# Patient Record
Sex: FEMALE | Race: WHITE | NOT HISPANIC OR LATINO | Employment: UNEMPLOYED | URBAN - METROPOLITAN AREA
[De-identification: names, ages, dates, MRNs, and addresses within clinical notes are randomized per-mention and may not be internally consistent; named-entity substitution may affect disease eponyms.]

---

## 2017-03-02 ENCOUNTER — OFFICE VISIT (OUTPATIENT)
Dept: OBSTETRICS AND GYNECOLOGY | Facility: CLINIC | Age: 32
End: 2017-03-02

## 2017-03-02 VITALS
DIASTOLIC BLOOD PRESSURE: 76 MMHG | SYSTOLIC BLOOD PRESSURE: 132 MMHG | HEIGHT: 66 IN | WEIGHT: 122.2 LBS | BODY MASS INDEX: 19.64 KG/M2

## 2017-03-02 DIAGNOSIS — R87.613 HGSIL (HIGH GRADE SQUAMOUS INTRAEPITHELIAL LESION) ON PAP SMEAR OF CERVIX: Primary | ICD-10-CM

## 2017-03-02 DIAGNOSIS — Z01.419 ENCOUNTER FOR GYNECOLOGICAL EXAMINATION WITHOUT ABNORMAL FINDING: ICD-10-CM

## 2017-03-02 DIAGNOSIS — N60.12 FIBROCYSTIC BREAST CHANGES, BILATERAL: ICD-10-CM

## 2017-03-02 DIAGNOSIS — N60.11 FIBROCYSTIC BREAST CHANGES, BILATERAL: ICD-10-CM

## 2017-03-02 PROCEDURE — 99395 PREV VISIT EST AGE 18-39: CPT | Performed by: OBSTETRICS & GYNECOLOGY

## 2017-03-02 NOTE — PROGRESS NOTES
"   Chief Complaint   Patient presents with   • Gynecologic Exam   • Follow-up     repeat pap       Rachel Bryson is a 31 y.o. year old  presenting to be seen for an annual wellness visit.  This patient had a Pap test 10/11/2016 read as high-grade SAY (moderate dysplasia).  She underwent colposcopy on 10/24/2016 with colposcopically directed biopsies read as low-grade SAY (mild dysplasia).  She returns today for a follow-up Pap test.  She denies abnormal discharge.  She has cyclic menses without intermenstrual bleeding.  She has fibrocystic changes of her breasts.       SCREENING TESTS    Year 2012 2016 2017 2018   Age                         PAP     HGSIL                    HPV high risk     +                    Mammogram                         MONALISA score                         Breast MRI                         Lipids                         Vitamin D                         Colonoscopy                         DEXA  Frax (hip/any)                         Ovarian Screen                           Enter the month test was performed.  If month not known, enter \"X'  · Black numbers = normal results  · Red numbers = abnormal results  · Black X = patient reported normal  · Red X - patient reported abnormal      Referred by:    Profession:    Other info:        Her LMP was Patient's last menstrual period was 2017 (exact date)..  Her cycles are regular, predictable and consistent every 28 - 32 days.  Usually her flow is normal with no more than 1 days of heavy flow each menses.   Additionally she describes No Symptoms associated with her menses.    History   Sexual Activity   • Sexual activity: Yes   • Partners: Male   • Birth control/ protection: Condom    She would not like to be screened for STD's at today's exam.     Current contraceptive methods being used: condoms.  In the coming year, she is not " "considering changing her current contraceptive method.    She exercises regularly: yes.  She wears her seat belt: yes.  She has concerns about domestic violence: no.    GYN screening history:  · Last pap: was done on approximately 10/11/2016 and the result was: HGSIL- moderate and severe dysplasia..    No Additional Complaints Reported    The following portions of the patient's history were reviewed and updated as appropriate:vital signs and   She  does not have any pertinent problems on file.  She  has a past surgical history that includes Tonsillectomy.  Her family history is not on file.  She  reports that she has never smoked. She does not have any smokeless tobacco history on file. She reports that she drinks alcohol. She reports that she does not use illicit drugs.  No current outpatient prescriptions on file.     No current facility-administered medications for this visit.      She has No Known Allergies..    Review of Systems  A comprehensive review of systems was negative.  Constitutional: negative for fever, chills, activity change, appetite change, fatigue and unexpected weight change.  Respiratory: negative  Cardiovascular: negative  Gastrointestinal: negative  Genitourinary:negative  Musculoskeletal:negative  Behavioral/Psych: negative     Objective     Visit Vitals   • /76   • Ht 66\" (167.6 cm)   • Wt 122 lb 3.2 oz (55.4 kg)   • LMP 02/19/2017 (Exact Date)   • BMI 19.72 kg/m2       Physical Exam    General:  alert; cooperative; well developed; well nourished   Skin:  No suspicious lesions seen   Thyroid: normal to inspection and palpation   Lungs:  clear to auscultation bilaterally   Heart:  regular rate and rhythm, S1, S2 normal, no murmur, click, rub or gallop   Breasts:  Examined in supine position  Symmetric without masses or skin dimpling  Nipples normal without inversion, lesions or discharge  There are no palpable axillary nodes  Fibrocystic changes are present both breasts without a " discrete mass   Abdomen: soft, non-tender; no masses  no umbilical or inginual hernias are present  no hepato-splenomegaly   Pelvis: Clinical staff was present for exam  External genitalia:  normal appearance of the external genitalia including Bartholin's and [place]'s glands.  Vaginal:  normal pink mucosa without prolapse or lesions.  Cervix:  normal appearance.  Uterus:  normal size, shape and consistency. anteverted;  Adnexa:  normal bimanual exam of the adnexa.  Rectal:  anus visually normal appearing. recto-vaginal exam unremarkable and confirms findings;     Lab Review   Pap results- HGSIL    Imaging  No data reviewed       ASSESSMENT  Problems Addressed this Visit        Genitourinary    HGSIL (high grade squamous intraepithelial lesion) on Pap smear of cervix - Primary    Relevant Orders    Liquid-based Pap Smear, Screening      Other Visit Diagnoses     Encounter for gynecological examination without abnormal finding              PLAN  · Medications prescribed this encounter: None  · Follow up: 6 month(s)   · Pap test done      *Please note that portions of this documentation may have been completed with a voice recognition program.  Efforts were made to edit this dictation, but occasional words may have been mistranscribed.       This note was electronically signed.    MIKE Medeiros MD  March 2, 2017  1:25 PM

## 2017-09-07 ENCOUNTER — OFFICE VISIT (OUTPATIENT)
Dept: OBSTETRICS AND GYNECOLOGY | Facility: CLINIC | Age: 32
End: 2017-09-07

## 2017-09-07 VITALS
BODY MASS INDEX: 18.68 KG/M2 | WEIGHT: 116.2 LBS | DIASTOLIC BLOOD PRESSURE: 70 MMHG | SYSTOLIC BLOOD PRESSURE: 102 MMHG | HEIGHT: 66 IN

## 2017-09-07 DIAGNOSIS — Z87.410 HISTORY OF CERVICAL DYSPLASIA: Primary | ICD-10-CM

## 2017-09-07 DIAGNOSIS — Z30.011 OCP (ORAL CONTRACEPTIVE PILLS) INITIATION: ICD-10-CM

## 2017-09-07 PROCEDURE — 99213 OFFICE O/P EST LOW 20 MIN: CPT | Performed by: OBSTETRICS & GYNECOLOGY

## 2017-09-07 RX ORDER — NORETHINDRONE ACETATE AND ETHINYL ESTRADIOL 1.5-30(21)
1 KIT ORAL DAILY
Qty: 28 TABLET | Refills: 6 | Status: SHIPPED | OUTPATIENT
Start: 2017-09-07 | End: 2017-09-19 | Stop reason: SDUPTHER

## 2017-09-07 NOTE — PROGRESS NOTES
Chief Complaint   Patient presents with   • Follow-up     repeat pap   • Contraception     desires oral contraceptives       Rachel Bryson is a 31 y.o. year old  presenting to be seen for a PAP in follow-up of a prior abnormal PAP and/or HPV screen. This patient has a history of a Pap test 10/24/2016 read as high-grade SAY.  She had colposcopy with directed biopsies on 10/24/2016 revealing FAUSTINO 1.  Her follow-up Pap test on 3/2/2017 was negative.  She returns for a follow-up Pap test.  She desires initiation of oral contraceptives and prefers a 30 µg dose.  He has had no side effects on oral contraceptives in the past.    Her last PAP: was done on approximately 3/2/2017 and the result was: normal PAP.  Her last HPV test: was done on approximately 3/2/2017 and the result was:  negative  Uses tobacco currently: no  Sexually active: yes  Current contraception: none    No Additional Complaints Reported    The following portions of the patient's history were reviewed and updated as appropriate:vital signs and   She  does not have any pertinent problems on file.  She  has a past surgical history that includes Tonsillectomy.  Her family history is not on file.  She  reports that she has never smoked. She has never used smokeless tobacco. She reports that she drinks alcohol. She reports that she does not use illicit drugs.  Current Outpatient Prescriptions   Medication Sig Dispense Refill   • norethindrone-ethinyl estradiol-iron (MICROGESTIN FE 1.5/30) 1.5-30 MG-MCG tablet Take 1 tablet by mouth Daily for 185 days. 28 tablet 6     No current facility-administered medications for this visit.      She has No Known Allergies..    Review of Systems  A comprehensive review of systems was negative.  Constitutional: negative for fever, chills, activity change, appetite change, fatigue and unexpected weight change.  Respiratory: negative  Cardiovascular: negative  Gastrointestinal:  "negative  Genitourinary:negative  Musculoskeletal:negative  Behavioral/Psych: negative        /70  Ht 66\" (167.6 cm)  Wt 116 lb 3.2 oz (52.7 kg)  LMP 08/26/2017 (Exact Date)  BMI 18.76 kg/m2    Physical Exam    General:  alert; cooperative; well developed; well nourished   Thyroid: normal to inspection and palpation   Abdomen: soft, non-tender; no masses  no umbilical or inginual hernias are present  no hepato-splenomegaly   Pelvis: Clinical staff was present for exam  External genitalia:  normal appearance of the external genitalia including Bartholin's and [place]'s glands.  Vaginal:  normal pink mucosa without prolapse or lesions.  Cervix:  normal appearance.  Uterus:  normal size, shape and consistency. anteverted;  Adnexa:  normal bimanual exam of the adnexa.  Rectal:  anus visually normal appearing. recto-vaginal exam unremarkable and confirms findings;     Lab Review   Pap test results- negative    Imaging   No data reviewed       ASSESSMENT  Problems Addressed this Visit     None      Visit Diagnoses     History of cervical dysplasia    -  Primary    Relevant Orders    Liquid-based Pap Smear, Screening    OCP (oral contraceptive pills) initiation        Relevant Medications    norethindrone-ethinyl estradiol-iron (MICROGESTIN FE 1.5/30) 1.5-30 MG-MCG tablet              PLAN     Follow up: 6 month(s) for Pap and Annual  Pap test done  Microgestin, 1.5/30 OCPs prescribed   *Please note that portions of this documentation may have been completed with a voice recognition program.  Efforts were made to edit this dictation, but occasional words may have been mistranscribed.  This note was electronically signed.    MIKE Medeiros MD  September 7, 2017  3:08 PM     "

## 2017-09-19 ENCOUNTER — OFFICE VISIT (OUTPATIENT)
Dept: OBSTETRICS AND GYNECOLOGY | Facility: CLINIC | Age: 32
End: 2017-09-19

## 2017-09-19 VITALS
DIASTOLIC BLOOD PRESSURE: 66 MMHG | HEIGHT: 66 IN | BODY MASS INDEX: 18.64 KG/M2 | SYSTOLIC BLOOD PRESSURE: 120 MMHG | WEIGHT: 116 LBS

## 2017-09-19 DIAGNOSIS — R87.613 HGSIL (HIGH GRADE SQUAMOUS INTRAEPITHELIAL LESION) ON PAP SMEAR OF CERVIX: Primary | ICD-10-CM

## 2017-09-19 DIAGNOSIS — Z30.011 OCP (ORAL CONTRACEPTIVE PILLS) INITIATION: ICD-10-CM

## 2017-09-19 PROCEDURE — 57455 BIOPSY OF CERVIX W/SCOPE: CPT | Performed by: OBSTETRICS & GYNECOLOGY

## 2017-09-19 RX ORDER — NORETHINDRONE ACETATE AND ETHINYL ESTRADIOL 1.5-30(21)
1 KIT ORAL DAILY
Qty: 28 TABLET | Refills: 5 | Status: SHIPPED | OUTPATIENT
Start: 2017-09-19 | End: 2018-03-23

## 2017-09-19 NOTE — PROGRESS NOTES
"Date of precedure: 9/19/2017  This patient was seen in our office on 10/11/16 for a Pap test that was read as high-grade SAY, HPV negative.  She underwent colposcopy on 10/24/16 with colposcopically directed biopsies read as mild dysplasia (FAUSTINO-1).  Her follow-up Pap test on 3/2/17 was negative, HPV-negative.  Her last Pap test on 9/17/17 was once again read as high-grade SAY, HPV negative.  She presents today for follow-up colposcopy with directed biopsies.  The procedure has once again been explained to the patient including the risks of pain, bleeding, and infection.  She voiced understanding of these risks.  /66  Ht 66\" (167.6 cm)  Wt 116 lb (52.6 kg)  LMP 08/26/2017 (Exact Date)  BMI 18.72 kg/m2    No OB History data found    Risks and benefits discussed? yes  All questions answered? yes  Consents given by the patient  Written consent obtained? yes    Local anesthesia used:  no    Pre-op indication: HGSIL- moderate and severe dysplasia  Procedure documentation:    The cervix was initially viewed colposcopically through a green filter.  The cervix was next bathed in dilute, acetic acid.   The findings were as follows:      The transformation zone was able to be seen adequately.    Findings  Mosaicism noted at 1 o'clock and 11 o'clock Physical Exam: Lungs- clear to auscultation,  C-V- RRR, with no murmur, rub or gallop  Abd.- Soft, non-tender, with no organomegaly           Ectocervical biopsies taken from 1 o'clock and 11 o'clock.  Monsels solution was applied to the biopsy sites..  An ECC was not performed.      Colposcopic Impression: 1. Adequate colposcopy  2. Colposcopic findings are inconsistent with PAP       Plan: Will base further treatment on pathology results  Have discussed Gardasil 9 with the patient and she will inquire about its cost since she is 31 years old.    *Please note that portions of this documentation may have been completed with a voice recognition program.  Efforts were made " to edit this dictation, but occasional words may have been mistranscribed.     This note was electronically signed.    MIKE Medeiros MD  September 19, 2017  9:02 AM

## 2017-10-30 ENCOUNTER — OFFICE VISIT (OUTPATIENT)
Dept: OBSTETRICS AND GYNECOLOGY | Facility: CLINIC | Age: 32
End: 2017-10-30

## 2017-10-30 VITALS
WEIGHT: 120.6 LBS | SYSTOLIC BLOOD PRESSURE: 110 MMHG | HEIGHT: 66 IN | DIASTOLIC BLOOD PRESSURE: 72 MMHG | BODY MASS INDEX: 19.38 KG/M2

## 2017-10-30 DIAGNOSIS — R87.613 HGSIL (HIGH GRADE SQUAMOUS INTRAEPITHELIAL LESION) ON PAP SMEAR OF CERVIX: Primary | ICD-10-CM

## 2017-10-30 PROCEDURE — 57461 CONZ OF CERVIX W/SCOPE LEEP: CPT | Performed by: OBSTETRICS & GYNECOLOGY

## 2017-10-30 RX ORDER — NAPROXEN SODIUM 550 MG/1
TABLET ORAL
Qty: 20 TABLET | Refills: 0 | Status: SHIPPED | OUTPATIENT
Start: 2017-10-30

## 2017-10-30 RX ORDER — DOXYCYCLINE HYCLATE 100 MG/1
100 CAPSULE ORAL 2 TIMES DAILY
Qty: 10 CAPSULE | Refills: 0 | Status: SHIPPED | OUTPATIENT
Start: 2017-10-30 | End: 2017-11-04

## 2017-10-30 NOTE — PROGRESS NOTES
PROCEDURE DATE: 10/30/2017  Chief Complaint   Patient presents with   • Procedure     LEEP       Rachel Bryson is a 31 y.o. year old  presenting to be seen for a LEEP procedure in the office.  This patient had a Pap test in 2016 read as high-grade SAY, HPV negative.  She returned for colposcopy on 10/24/2016 with biopsies read as FAUSTINO 1.  She had a follow-up Pap test on 2017 which was negative and HPV negative.  Her follow-up Pap test on 2017 was again read as high-grade SAY, HPV negative.  Repeat colposcopy on 2017 with biopsies of the cervix, were read as cervicitis.  I talked with Dr. Olson about the discrepancy between her Pap test results and biopsy results.  He reviewed all of the Pap tests and indicated that they were accurately read, and that the biopsies were accurately read.  Due to the discrepancies he asked that we obtain more tissue for evaluation.  I have explained these findings to the patient and indicated that we need to proceed with a shallow LEEP procedure in order to have adequate tissue for pathology to evaluate.  She understands the need for this procedure including the risks of subsequent cervical stenosis and cervical incompetence.      Risks and benefits discussed? yes  All questions answered? yes  Consents given by the patient  Written consent obtained? yes    Local anesthesia used:  yes - 12 cc's of  Meds; anesthesia local: 1% lidocaine with pitressin and saline.    Procedure documentation: In the exam room the patient was placed in lithotomy position.  An insulated speculum was introduced.  The cervix was prepared with dilute acetic acid.  There was evidence of mosaicism from 9:00 to 12:00 and from 4:00 to 6:00.  The cervix was then injected with 12 cc of saline/Pitressin/lidocaine.  Using a 1.2 x 0.7 cm loop the transformation zone was excised.  No endocervical specimen was obtained.  The procedure was well tolerated without complications.  The  patient will take doxycycline, 100 mg twice a day for 5 days and was given Anaprox DS, 550 mg (20) to use when necessary for cramping.    Findings:  Mosaicism from 4:00-6:00 and from 9:00-12:00. Diagnosis: HGSIL on Pap test [R87.613]    Plan: LEEP procedure  *Please note that portions of this documentation may have been completed with a voice recognition program.  Efforts were made to edit this dictation, but occasional words may have been mistranscribed.  This note was electronically signed.    MIKE Medeiros MD  October 30, 2017  4:57 PM

## 2017-12-04 ENCOUNTER — OFFICE VISIT (OUTPATIENT)
Dept: OBSTETRICS AND GYNECOLOGY | Facility: CLINIC | Age: 32
End: 2017-12-04

## 2017-12-04 VITALS
SYSTOLIC BLOOD PRESSURE: 110 MMHG | HEIGHT: 66 IN | WEIGHT: 125 LBS | BODY MASS INDEX: 20.09 KG/M2 | DIASTOLIC BLOOD PRESSURE: 76 MMHG

## 2017-12-04 DIAGNOSIS — Z98.890 POSTOPERATIVE STATE: Primary | ICD-10-CM

## 2017-12-04 PROCEDURE — 99213 OFFICE O/P EST LOW 20 MIN: CPT | Performed by: OBSTETRICS & GYNECOLOGY

## 2017-12-04 NOTE — PROGRESS NOTES
Chief Complaint   Patient presents with   • Post-op     patient states that she and her  are probably moving to [place].       Rachel Bryson is a 31 y.o. year old  presenting to be seen for her Post procedure visit after having a LEEP procedure on 2017.  Beginning in the  of  this patient had a major discrepancy between her Pap test readings of high-grade dysplasia and her colposcopically directed biopsy readings of low-grade dysplasia (FAUSTINO 1).  Because of this the pathologist recommended a LEEP procedure to obtain more tissue.  This was done on  with a shallow cone.  Final pathology was read as FAUSTINO-1.  She has had no postoperative problems.  Her last period was normal.     Procedure:  LEEP    ROS:  A comprehensive review of systems was negative.  Constitutional: negative for fever, chills, activity change, appetite change, fatigue and unexpected weight change.  Respiratory: negative   Cardiovascular: negative  Gastrointestinal: negative  Genitourinary:negative  Musculoskeletal:negative  Behavioral/Psych: negative      Symptoms:  Fever  No, Chills/Sweat  No, Normal Bowel Function  Yes, Normal Urinary Function  Yes and Abnormal Discharge   No    LMP- normal        Physical Exam:  Lungs:  Normal expansion.  Clear to auscultation.  No rales, rhonchi, or wheezing.  Abdomen:  Soft, non-tender, normal bowel sounds; no bruits, organomegaly or masses.   Pelvic:  Clinical staff was present for exam  External genitalia:  normal appearance of the external genitalia including Bartholin's and [place]'s glands.  Vaginal:  normal pink mucosa without prolapse or lesions.  Cervix:  normal appearance. well healed and good cervical length  Uterus:  normal size, shape and consistency. anteverted;  Adnexa:  normal bimanual exam of the adnexa.      I spent 14 minutes in face to face discussion with the patient about the importance of gynecologic follow up after she moves to [place].  I  have suggested that she have her next Pap test in 4 months and if normal repeat it 6 months later.  She voiced understanding of the need for follow up.    Impression: 1) Post op- LEEP procedure (FAUSTINO-1)       PLAN:  1. Follow up: The patient is moving to [place] and I recommended that she have a Pap test in 4 months there.  2. Please note that portions of this documentation may have been completed with a voice recognition program.  Efforts were made to edit this dictation, but occasional words may have been mistranscribed.      This note was electronically signed.    MIKE Medeiros MD  December 4, 2017  1:51 PM